# Patient Record
Sex: MALE | Race: OTHER
[De-identification: names, ages, dates, MRNs, and addresses within clinical notes are randomized per-mention and may not be internally consistent; named-entity substitution may affect disease eponyms.]

---

## 2018-03-16 ENCOUNTER — HOSPITAL ENCOUNTER (EMERGENCY)
Dept: HOSPITAL 72 - EMR | Age: 18
Discharge: HOME | End: 2018-03-16
Payer: MEDICAID

## 2018-03-16 VITALS — DIASTOLIC BLOOD PRESSURE: 79 MMHG | SYSTOLIC BLOOD PRESSURE: 120 MMHG

## 2018-03-16 VITALS — SYSTOLIC BLOOD PRESSURE: 120 MMHG | DIASTOLIC BLOOD PRESSURE: 79 MMHG

## 2018-03-16 VITALS — BODY MASS INDEX: 20.89 KG/M2 | HEIGHT: 66 IN | WEIGHT: 130 LBS

## 2018-03-16 DIAGNOSIS — G24.09: Primary | ICD-10-CM

## 2018-03-16 DIAGNOSIS — J45.909: ICD-10-CM

## 2018-03-16 PROCEDURE — 99284 EMERGENCY DEPT VISIT MOD MDM: CPT

## 2018-03-16 PROCEDURE — 96374 THER/PROPH/DIAG INJ IV PUSH: CPT

## 2018-03-16 NOTE — EMERGENCY ROOM REPORT
History of Present Illness


General


Chief Complaint:  General Complaint


Source:  Patient





Present Illness


HPI


17-year-old male presents with acute dystonic reaction soon after taking 30mg 

Reglan for a headache.  This was the first time he ever took Reglan, and the 

bottle states one tab 3 times a day and so he mistakenly took 3 tabs at once.  

He was prescribed it for her headaches he had after being diagnosed with a 

concussion.  His face and arms became spastic contorted and he cannot relax 

him.  EMS was called and gave him 25 IV Benadryl without any relief.  Patient 

is able to speak but reports he cannot stop the contortion of his face or arms.


Allergies:  


Coded Allergies:  


     No Known Allergies (Unverified , 3/16/18)





Patient History


Past Medical History:  see triage record


Reviewed Nursing Documentation:  PMH: Agreed, PSxH: Agreed





Nursing Documentation-PMH


Hx Asthma:  Yes





Review of Systems


All Other Systems:  negative except mentioned in HPI





Physical Exam





Vital Signs








  Date Time  Temp Pulse Resp B/P (MAP) Pulse Ox O2 Delivery O2 Flow Rate FiO2


 


3/16/18 19:01 98.0 105 18 130/90 99 Room Air  





 98.1       








Sp02 EP Interpretation:  reviewed, normal


General Appearance:  no apparent distress, alert, non-toxic


Head:  normocephalic


Eyes:  bilateral eye normal inspection, bilateral eye PERRL, bilateral eye EOMI


ENT:  normal ENT inspection, hearing grossly normal, normal pharynx, no 

angioedema, normal voice, moist mucus membranes


Neck:  normal inspection, full range of motion, supple, supple/symm/no masses


Respiratory:  chest non-tender, lungs clear, normal breath sounds, chest 

symmetrical, palpation of chest normal


Cardiovascular #1:  normal peripheral pulses, regular rate, rhythm


Cardiovascular #2:  2+ radial (R), 2+ radial (L)


Gastrointestinal:  normal inspection, non tender, soft, no mass, no guarding, 

no rebound


Rectal:  deferred


Genitourinary:  normal inspection, no CVA tenderness


Musculoskeletal:  back normal, gait/station normal, normal range of motion, non-

tender, no calf tenderness


Neurologic:  alert, responsive, CNs III-XII nml as tested, motor strength/tone 

normal - Hypertonicity in bilateral upper extremities with contortion of his 

facial muscles to the right and neck to the right as well as both hands 

contorted and stiff, sensory intact, speech normal


Psychiatric:  judgement/insight normal, memory normal, mood/affect normal, no 

suicidal/homicidal ideation


Skin:  normal color, no rash, warm/dry, normal turgor


Lymphatic:  no adenopathy





Medical Decision Making


Reaction to Intervention:  Improved


Diagnostic Impression:  


 Primary Impression:  


 Acute dystonic reaction due to drugs





Rhythm Strip Diag. Results


Rhythm Strip Time:  21:22


EP Interpretation:  yes


Rate:  88


Rhythm:  NSR, no PVC's, no ectopy





Last Vital Signs








  Date Time  Temp Pulse Resp B/P (MAP) Pulse Ox O2 Delivery O2 Flow Rate FiO2


 


3/16/18 19:01 98.0 105 18 130/90 99 Room Air  





 98.1       








Status:  improved


Reevaluation Impression


Patient's reaction improved greatly but he was starting to develop akathisia 

even though his dystonias improved.  He was given 50 mg of Benadryl prior to 

arrival so I cannot give him any more, he actually had good effect with Ativan 

as well.  The initial 2 mg IV benztropine worked very well for his dystonia, 

but then he was still with a akathisia's.  Discharge him with instructions to 

avoid Reglan in the future, and if he decides to take in the future ever once 

his current reaction is resolved that he take it with Benadryl and only take 10 

mg at a time.  I will discharge him with benztropine 2 mg twice a day for 10 

days.


Disposition:  HOME, SELF-CARE


Condition:  Stable


Scripts


Benztropine Mesylate* (BENZTROPINE MESYLATE*) 2 Mg Tablet


2 MG ORAL TWICE A DAY for 10 Days, TAB


   Prov: NIMCO KLINE M.D         3/16/18











NIMCO KLINE M.D Mar 16, 2018 19:10